# Patient Record
Sex: FEMALE | Race: ASIAN | Employment: OTHER | URBAN - METROPOLITAN AREA
[De-identification: names, ages, dates, MRNs, and addresses within clinical notes are randomized per-mention and may not be internally consistent; named-entity substitution may affect disease eponyms.]

---

## 2022-01-07 ENCOUNTER — APPOINTMENT (OUTPATIENT)
Dept: GENERAL RADIOLOGY | Age: 47
End: 2022-01-07
Payer: COMMERCIAL

## 2022-01-07 ENCOUNTER — HOSPITAL ENCOUNTER (EMERGENCY)
Age: 47
Discharge: HOME | End: 2022-01-07
Attending: EMERGENCY MEDICINE
Payer: COMMERCIAL

## 2022-01-07 ENCOUNTER — HOSPITAL ENCOUNTER (OUTPATIENT)
Dept: CT IMAGING | Age: 47
Discharge: HOME | End: 2022-01-07
Payer: COMMERCIAL

## 2022-01-07 VITALS
HEIGHT: 69 IN | SYSTOLIC BLOOD PRESSURE: 122 MMHG | BODY MASS INDEX: 21.48 KG/M2 | OXYGEN SATURATION: 100 % | HEART RATE: 51 BPM | WEIGHT: 145 LBS | DIASTOLIC BLOOD PRESSURE: 70 MMHG | TEMPERATURE: 97.7 F | RESPIRATION RATE: 15 BRPM

## 2022-01-07 DIAGNOSIS — S82.892A CLOSED FRACTURE DISLOCATION OF LEFT ANKLE, INITIAL ENCOUNTER: Primary | ICD-10-CM

## 2022-01-07 DIAGNOSIS — M25.572 ACUTE LEFT ANKLE PAIN: ICD-10-CM

## 2022-01-07 PROCEDURE — 6360000002 HC RX W HCPCS: Performed by: EMERGENCY MEDICINE

## 2022-01-07 PROCEDURE — 27752 TREATMENT OF TIBIA FRACTURE: CPT

## 2022-01-07 PROCEDURE — 29125 APPL SHORT ARM SPLINT STATIC: CPT

## 2022-01-07 PROCEDURE — 99284 EMERGENCY DEPT VISIT MOD MDM: CPT

## 2022-01-07 PROCEDURE — 99152 MOD SED SAME PHYS/QHP 5/>YRS: CPT

## 2022-01-07 PROCEDURE — 2500000003 HC RX 250 WO HCPCS: Performed by: EMERGENCY MEDICINE

## 2022-01-07 PROCEDURE — 73700 CT LOWER EXTREMITY W/O DYE: CPT

## 2022-01-07 PROCEDURE — 73610 X-RAY EXAM OF ANKLE: CPT

## 2022-01-07 RX ORDER — FENTANYL CITRATE 50 UG/ML
100 INJECTION, SOLUTION INTRAMUSCULAR; INTRAVENOUS ONCE
Status: DISCONTINUED | OUTPATIENT
Start: 2022-01-07 | End: 2022-01-07 | Stop reason: HOSPADM

## 2022-01-07 RX ORDER — ETOMIDATE 2 MG/ML
INJECTION INTRAVENOUS DAILY PRN
Status: COMPLETED | OUTPATIENT
Start: 2022-01-07 | End: 2022-01-07

## 2022-01-07 RX ORDER — ETOMIDATE 2 MG/ML
10 INJECTION INTRAVENOUS ONCE
Status: DISCONTINUED | OUTPATIENT
Start: 2022-01-07 | End: 2022-01-07 | Stop reason: HOSPADM

## 2022-01-07 RX ORDER — FENTANYL CITRATE 50 UG/ML
INJECTION, SOLUTION INTRAMUSCULAR; INTRAVENOUS DAILY PRN
Status: COMPLETED | OUTPATIENT
Start: 2022-01-07 | End: 2022-01-07

## 2022-01-07 RX ORDER — FENTANYL CITRATE 50 UG/ML
25 INJECTION, SOLUTION INTRAMUSCULAR; INTRAVENOUS ONCE
Status: COMPLETED | OUTPATIENT
Start: 2022-01-07 | End: 2022-01-07

## 2022-01-07 RX ORDER — OXYCODONE HYDROCHLORIDE AND ACETAMINOPHEN 5; 325 MG/1; MG/1
1 TABLET ORAL EVERY 6 HOURS PRN
Qty: 16 TABLET | Refills: 0 | Status: SHIPPED | OUTPATIENT
Start: 2022-01-07 | End: 2022-01-11

## 2022-01-07 RX ORDER — ONDANSETRON 2 MG/ML
4 INJECTION INTRAMUSCULAR; INTRAVENOUS ONCE
Status: COMPLETED | OUTPATIENT
Start: 2022-01-07 | End: 2022-01-07

## 2022-01-07 RX ADMIN — ETOMIDATE 10 MG: 2 INJECTION, SOLUTION INTRAVENOUS at 09:43

## 2022-01-07 RX ADMIN — ONDANSETRON 4 MG: 2 INJECTION INTRAMUSCULAR; INTRAVENOUS at 09:32

## 2022-01-07 RX ADMIN — FENTANYL CITRATE 25 MCG: 50 INJECTION, SOLUTION INTRAMUSCULAR; INTRAVENOUS at 09:27

## 2022-01-07 RX ADMIN — FENTANYL CITRATE 50 MCG: 50 INJECTION, SOLUTION INTRAMUSCULAR; INTRAVENOUS at 09:38

## 2022-01-07 RX ADMIN — FENTANYL CITRATE 25 MCG: 50 INJECTION, SOLUTION INTRAMUSCULAR; INTRAVENOUS at 09:41

## 2022-01-07 RX ADMIN — FAMOTIDINE 20 MG: 10 INJECTION, SOLUTION INTRAVENOUS at 09:32

## 2022-01-07 ASSESSMENT — PAIN SCALES - GENERAL
PAINLEVEL_OUTOF10: 9

## 2022-01-07 ASSESSMENT — PAIN DESCRIPTION - LOCATION
LOCATION: ANKLE
LOCATION: ANKLE

## 2022-01-07 ASSESSMENT — PAIN DESCRIPTION - ORIENTATION
ORIENTATION: LEFT
ORIENTATION: LEFT

## 2022-01-07 ASSESSMENT — PAIN DESCRIPTION - PAIN TYPE
TYPE: ACUTE PAIN
TYPE: ACUTE PAIN

## 2022-01-07 NOTE — ED PROVIDER NOTES
TRIAGE CHIEF COMPLAINT:   Chief Complaint   Patient presents with    Ankle Pain       HPI: Johnny High is a 51 y.o. female who presents to the emergency department with complaint of left ankle injury. 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 Complains of severe pain and deformity. She was brought here by her . Denies any knee or hip pain. No head injury, headache, neck pain or back pain. No loss of consciousness. REVIEW OF SYSTEMS:   10 systems reviewed. Pertinent positives per HPI. Otherwise noted to be negative. I have reviewed the triage/nursing documentation and agree unless otherwise noted below. PAST MEDICAL HISTORY:   History reviewed. No pertinent past medical history. CURRENT MEDICATIONS:   Patient's Medications   New Prescriptions    No medications on file   Previous Medications    CICLOPIROX EX    Apply topically    IBUPROFEN (ADVIL;MOTRIN) 800 MG TABLET    Take 1 tablet by mouth every 8 hours as needed for Pain   Modified Medications    No medications on file   Discontinued Medications    No medications on file        SURGICAL HISTORY:   History reviewed. No pertinent surgical history. FAMILY HISTORY:   History reviewed. No pertinent family history. SOCIAL HISTORY:    reports that she has never smoked. She has never used smokeless tobacco. She reports that she does not drink alcohol and does not use drugs. ALLERGIES: No Known Allergies    PHYSICAL EXAM:  VITAL SIGNS: /83   Pulse 52   Temp 97.7 °F (36.5 °C)   Resp 14   Ht 5' 8.5\" (1.74 m)   Wt 145 lb (65.8 kg)   LMP 12/17/2021   SpO2 100%   BMI 21.73 kg/m²   Constitutional:  No acute distress, Non-toxic appearance  HENT: Normocephalic, Atraumatic Oropharynx moist, No oral exudates. TMs are normal.  Eyes:  PERRL, EOMI, Conjunctiva normal, No discharge. Neck: No tenderness, Supple, No lymphadenopathy, No stridor.   Cardiovascular:  Normal heart rate, Normal rhythm, No murmurs, No rubs, No gallops. Pulmonary/Chest:  Normal breath sounds, No respiratory distress, No wheezing,  Abdomen:   Soft, No tenderness, No masses, No pulsatile masses  Back:  No tenderness, No CVA tenderness  Extremities: She has a deformity of the left ankle with external rotation at the joint. Skin is intact. Dorsalis pedis pulses palpable. No bony foot tenderness, calf or knee tenderness. Neurologic:  Alert & oriented x 3, Speech is clear and appropriate, No upper extremity drift or lower extremity weakness,  Normal sensory function, No facial asymmetry, no truncal or extremity ataxia. Normal gait. Skin:  Warm, Dry, No erythema, No rash  Psychiatric:  Affect normal, Mood normal      EKG:    EKG interpreted by myself. Radiology:  XR ANKLE LEFT (MIN 3 VIEWS)   Final Result   Impression: Interval reduction of the previously noted displaced tibial fracture with disruption of the ankle mortise. Findings concerning for a nondisplaced fracture of the talus. XR ANKLE LEFT (MIN 3 VIEWS)   Final Result   Impression: Comminuted tibial fracture, as above with disruption of the ankle mortise and anterior subluxation of the tibia in relation to the talus. LAB    PROCEDURAL SEDATION    TOTAL MODERATE SEDATION INTRA-SERVICE TIME WAS  15  MINUTES      ASA Classification: 1   Class 1: A normal healthy patient   Class 2: Pt with mild to moderate systemic disease   Class 3: Severe systemic disease or disturbance   Class 4: Severe systemic disorders that are already life threatening. Class 5: Moribund pt with little chances of survival, for more than 24 hours. Mallampati I Airway Classification:   1       The patient is an appropriate candidate to undergo the planned procedure sedation and anesthesia.  (Refer to nursing sedation/analgesia documentation record)  Formulation and discussion of sedation/procedure plan, risks, and expectations with patient and/or responsible adult completed. Patient examined immediately prior to the procedure. (Refer to nursing sedation/analgesia documentation record)    Pre-procedure diagnostic studies complete and results available. (Y/N) Y  Previous sedation/anesthesia experiences assessed. (Y/N) Y  Time out performed immediately prior to procedure. (Y/N) Y      POST-PROCEDURE COMMENTS    Sedative agents used (ie, Propofol, Etomidate, Versed): Etomidate 10mg  Analgesic agents used (ie, Fentanyl, Morphine, Dilaudid) : Fentanyl  Adjunctive agents used (ie, Atropine, Ketamine): NA  Physicians/Assistants: MD Justin Berger, SAIMA, Haydee Claros, SAIMA, Yoli Turner RCP  Procedure Performed: Closed reduction of left ankle fracture dislocation        Complications: none      Sugar-tong and posterior OCL splint was placed by a healthcare professional with knowledge and specialization in splint application while under the direct supervision of the treating physician. NV exam was normal after placement including range of motion of the toes, normal capillary refill and normal sensation. Instructions for use of the splint were provided. The patient was instructed to contact the [facility] ED immediately should the splint cause increased pain, decreased sensation, increased swelling or worsening of the condition. ED COURSE & MEDICAL DECISION MAKING:  Pertinent Labs & Imaging studies reviewed. (See chart for details)  70-year-old female with fracture dislocation of the left ankle, reduced here under IV conscious sedation with postreduction films showing reduction of the dislocation with previously noted displaced tibial fracture and findings also concerning for nondisplaced fracture of the talus. Patient has fully recovered from the IV conscious sedation.   Recheck vital signs including O2 saturation on room air were normal.  She was alert with a normal neurologic exam.  I discussed the case with Kari orthopedic on-call  Ronda's medical assistant at [address] [facility] [address] She stated that Dr. Dalia Mariscal could see the patient today at 1 PM at the [facility] office. Patient is agreeable to this. She was given crutches. A prescription of Percocet was sent to her pharmacy. Recommended rest ice and elevation. Prior to discharge the patient has normal capillary refill, normal range of motion and normal sensation in her toes. I discussed with Rene Li the results of the evaluation in the Emergency Department, diagnosis, care, prognosis and the importance of follow-up. The patient is stable for discharge. The patient and/or family are in agreement with the plan and all questions have been answered. Specific discharge instructions were explained, including reasons to return to the emergency department.                 (Please note that portions of this note may have been completed with a voice recognition program.  Efforts were made to edit the dictation but occasionally words are mis-transcribed)      FINAL IMPRESSION:  1 --fracture dislocation of left ankle, reduced                  Karina Castillo MD  01/07/22 1631

## 2022-01-07 NOTE — ED NOTES
Pt tolerating PO fluids. Demonstrated correct use of crutches prior to leaving.       Lizzeth Manriquez RN  01/07/22 3920